# Patient Record
Sex: MALE | Race: BLACK OR AFRICAN AMERICAN | NOT HISPANIC OR LATINO | URBAN - METROPOLITAN AREA
[De-identification: names, ages, dates, MRNs, and addresses within clinical notes are randomized per-mention and may not be internally consistent; named-entity substitution may affect disease eponyms.]

---

## 2019-05-01 ENCOUNTER — EMERGENCY (EMERGENCY)
Facility: HOSPITAL | Age: 34
LOS: 1 days | Discharge: HOME | End: 2019-05-01
Admitting: EMERGENCY MEDICINE
Payer: SELF-PAY

## 2019-05-01 VITALS
RESPIRATION RATE: 17 BRPM | TEMPERATURE: 98 F | SYSTOLIC BLOOD PRESSURE: 136 MMHG | DIASTOLIC BLOOD PRESSURE: 84 MMHG | HEART RATE: 81 BPM | OXYGEN SATURATION: 100 %

## 2019-05-01 PROCEDURE — 73630 X-RAY EXAM OF FOOT: CPT | Mod: 26,RT

## 2019-05-01 PROCEDURE — 99283 EMERGENCY DEPT VISIT LOW MDM: CPT

## 2019-05-01 RX ORDER — TETANUS TOXOID, REDUCED DIPHTHERIA TOXOID AND ACELLULAR PERTUSSIS VACCINE, ADSORBED 5; 2.5; 8; 8; 2.5 [IU]/.5ML; [IU]/.5ML; UG/.5ML; UG/.5ML; UG/.5ML
0.5 SUSPENSION INTRAMUSCULAR ONCE
Qty: 0 | Refills: 0 | Status: COMPLETED | OUTPATIENT
Start: 2019-05-01 | End: 2019-05-01

## 2019-05-01 RX ORDER — IBUPROFEN 200 MG
600 TABLET ORAL ONCE
Qty: 0 | Refills: 0 | Status: COMPLETED | OUTPATIENT
Start: 2019-05-01 | End: 2019-05-01

## 2019-05-01 RX ADMIN — Medication 600 MILLIGRAM(S): at 15:54

## 2019-05-01 RX ADMIN — TETANUS TOXOID, REDUCED DIPHTHERIA TOXOID AND ACELLULAR PERTUSSIS VACCINE, ADSORBED 0.5 MILLILITER(S): 5; 2.5; 8; 8; 2.5 SUSPENSION INTRAMUSCULAR at 15:53

## 2019-05-01 NOTE — ED PROVIDER NOTE - PLAN OF CARE
PLEASE KEEP AFFECTED EXTERMITY ELEVATED AS MUCH AS POSSIBLE, APPLY BACITRACIN TO SCRAPE AFTER YOU CLEAN WITH PLAIN SOAP AND WATER; TRY NO MINIMIZE WEIGHT BEARING ON AFFECTED SIDE; TAKE IBUPROFEN 600MG EVERY 8HRS AS NEEDED FOR PAIN, FOLLOW UP WITH PODIATRIST.

## 2019-05-01 NOTE — ED ADULT TRIAGE NOTE - CHIEF COMPLAINT QUOTE
Co right foot pain s/p 35 pound weight falling on foot x 30 minutes ago. States foot is swollen. Pt walking on heel of foot. Denies pmh.

## 2019-05-01 NOTE — ED PROVIDER NOTE - CLINICAL SUMMARY MEDICAL DECISION MAKING FREE TEXT BOX
s/p injury to Rt foot with pain and edema, small superficial abrasion, not sure of tdap - will get xrays, ice, pain control, will update tdap;

## 2019-05-01 NOTE — ED PROVIDER NOTE - CARE PLAN
Principal Discharge DX:	Foot contusion  Assessment and plan of treatment:	PLEASE KEEP AFFECTED EXTERMITY ELEVATED AS MUCH AS POSSIBLE, APPLY BACITRACIN TO SCRAPE AFTER YOU CLEAN WITH PLAIN SOAP AND WATER; TRY NO MINIMIZE WEIGHT BEARING ON AFFECTED SIDE; TAKE IBUPROFEN 600MG EVERY 8HRS AS NEEDED FOR PAIN, FOLLOW UP WITH PODIATRIST.

## 2019-05-01 NOTE — ED PROVIDER NOTE - PHYSICAL EXAMINATION
Rt foot - (++) TTP and edema over distal aspect of 2-4th metatarsals as well as proximal digits 2-4 with superficial abrasion to the area, 2+ DP

## 2019-05-01 NOTE — ED PROVIDER NOTE - OBJECTIVE STATEMENT
Pt is 33 y/o male with no significant PMhx here for eval s/p injury to RT foot. Pt states he was moving the furniture around, there was a  35lb dumbell on the dresser which fell on pt's foot earlier today. Pt sustained abrasion to top foot, also pain to the distal aspect of foot and 2-4 toes. not sure of tdap; denies sensation loss; no pain meds taken pta';